# Patient Record
Sex: FEMALE | URBAN - METROPOLITAN AREA
[De-identification: names, ages, dates, MRNs, and addresses within clinical notes are randomized per-mention and may not be internally consistent; named-entity substitution may affect disease eponyms.]

---

## 2017-12-14 ENCOUNTER — APPOINTMENT (OUTPATIENT)
Age: 52
Setting detail: DERMATOLOGY
End: 2017-12-14

## 2017-12-14 VITALS — HEIGHT: 62 IN | WEIGHT: 180 LBS

## 2017-12-14 DIAGNOSIS — L30.9 DERMATITIS, UNSPECIFIED: ICD-10-CM

## 2017-12-14 DIAGNOSIS — L57.8 OTHER SKIN CHANGES DUE TO CHRONIC EXPOSURE TO NONIONIZING RADIATION: ICD-10-CM

## 2017-12-14 PROBLEM — J30.1 ALLERGIC RHINITIS DUE TO POLLEN: Status: ACTIVE | Noted: 2017-12-14

## 2017-12-14 PROCEDURE — OTHER SUNSCREEN RECOMMENDATIONS: OTHER

## 2017-12-14 PROCEDURE — OTHER FOLLOW UP FOR NEXT VISIT: OTHER

## 2017-12-14 PROCEDURE — 99203 OFFICE O/P NEW LOW 30 MIN: CPT

## 2017-12-14 PROCEDURE — OTHER PRESCRIPTION: OTHER

## 2017-12-14 PROCEDURE — OTHER REASSURANCE: OTHER

## 2017-12-14 PROCEDURE — OTHER TREATMENT REGIMEN: OTHER

## 2017-12-14 PROCEDURE — OTHER COUNSELING: OTHER

## 2017-12-14 RX ORDER — TRIAMCINOLONE ACETONIDE 1 MG/G
THIN COAT CREAM TOPICAL BID
Qty: 1 | Refills: 1 | Status: ERX | COMMUNITY
Start: 2017-12-14

## 2017-12-14 ASSESSMENT — BSA RASH: BSA RASH: 7

## 2017-12-14 ASSESSMENT — LOCATION SIMPLE DESCRIPTION DERM
LOCATION SIMPLE: CHEST
LOCATION SIMPLE: RIGHT UPPER BACK

## 2017-12-14 ASSESSMENT — LOCATION ZONE DERM: LOCATION ZONE: TRUNK

## 2017-12-14 ASSESSMENT — LOCATION DETAILED DESCRIPTION DERM
LOCATION DETAILED: LEFT MEDIAL SUPERIOR CHEST
LOCATION DETAILED: RIGHT MEDIAL UPPER BACK

## 2017-12-14 ASSESSMENT — SEVERITY ASSESSMENT: SEVERITY: MILD TO MODERATE

## 2017-12-14 ASSESSMENT — PAIN INTENSITY VAS: HOW INTENSE IS YOUR PAIN 0 BEING NO PAIN, 10 BEING THE MOST SEVERE PAIN POSSIBLE?: NO PAIN

## 2017-12-14 NOTE — PROCEDURE: REASSURANCE
Additional Note: Very normal basic skin exam, no suspicious lesions, no evidence of any pre-cancers or skin cancers. Patient also has numerous moles but she does get a regular skin exam elsewhere
Include Location In Plan?: Yes
Detail Level: Zone

## 2017-12-14 NOTE — HPI: RASH
How Severe Is Your Rash?: mild
Is This A New Presentation, Or A Follow-Up?: Rash
Additional History: Patient has a rash for the last one month, it seems to come and go with no particular pattern. There are days when it is flat and red, other days when it is red, raised and puffy. It will occasionally itch but it is not terribly itchy. There is no pain associated with it. There is no particular pattern that the patient is aware of, she has not started any new medications. She has no other symptoms: no headache, no joint pain, no fever, no chills, no weight loss. Of note she did have a positive AIDE with her primary care physician several days ago 1:1280 but they have not pursued any diagnostic testing regarding that. She also did see an allergist who told her this was not an allergic reaction

## 2017-12-14 NOTE — PROCEDURE: TREATMENT REGIMEN
Detail Level: Detailed
Initiate Treatment: Zyrtec 10 mg BID
Continue Regimen: Zantac 150 mg BID
Discontinue Regimen: Claratin
Plan: This is acting like possible chronic idiopathic urticaria however there aren’t the classics features. I do not believe this represents anything deadly or dangerous, no evidence of anything contagious. The positive AIDE is suspicious for a possible autoimmune process that makes it difficult to know what direction to head in. My plan is to have the patient change her anti-histamine to Zyrtec 10 mg twice daily, to start using the topical steroid twice daily for two weeks but if no change patient will follow up for biopsy and that may help direct us in the direction for diagnosis. If the patient worsens she will call for follow up sooner than two weeks. There is a possibility this could be medication related although I think that unlikely